# Patient Record
Sex: FEMALE | Race: WHITE | NOT HISPANIC OR LATINO | Employment: OTHER | URBAN - METROPOLITAN AREA
[De-identification: names, ages, dates, MRNs, and addresses within clinical notes are randomized per-mention and may not be internally consistent; named-entity substitution may affect disease eponyms.]

---

## 2024-01-01 ENCOUNTER — HOSPITAL ENCOUNTER (INPATIENT)
Facility: HOSPITAL | Age: 69
LOS: 1 days | DRG: 250 | End: 2024-08-10
Attending: EMERGENCY MEDICINE | Admitting: HOSPITALIST
Payer: MEDICARE

## 2024-01-01 VITALS
RESPIRATION RATE: 65 BRPM | DIASTOLIC BLOOD PRESSURE: 74 MMHG | SYSTOLIC BLOOD PRESSURE: 110 MMHG | WEIGHT: 140 LBS | OXYGEN SATURATION: 95 %

## 2024-01-01 DIAGNOSIS — I46.9 CARDIOPULMONARY ARREST: ICD-10-CM

## 2024-01-01 DIAGNOSIS — I21.3 ST ELEVATION MYOCARDIAL INFARCTION (STEMI), UNSPECIFIED ARTERY: ICD-10-CM

## 2024-01-01 DIAGNOSIS — R07.9 CHEST PAIN: ICD-10-CM

## 2024-01-01 DIAGNOSIS — R57.0: ICD-10-CM

## 2024-01-01 DIAGNOSIS — I46.9 CARDIAC ARREST: Primary | ICD-10-CM

## 2024-01-01 DIAGNOSIS — R00.1 BRADYCARDIA: ICD-10-CM

## 2024-01-01 LAB
ALBUMIN SERPL BCP-MCNC: 4 G/DL (ref 3.5–5.2)
ALP SERPL-CCNC: 67 U/L (ref 55–135)
ALT SERPL W/O P-5'-P-CCNC: 46 U/L (ref 10–44)
ANION GAP SERPL CALC-SCNC: 20 MMOL/L (ref 8–16)
AST SERPL-CCNC: 46 U/L (ref 10–40)
BASOPHILS # BLD AUTO: 0.07 K/UL (ref 0–0.2)
BASOPHILS NFR BLD: 0.6 % (ref 0–1.9)
BILIRUB SERPL-MCNC: 0.4 MG/DL (ref 0.1–1)
BNP SERPL-MCNC: 197 PG/ML (ref 0–99)
BUN SERPL-MCNC: 17 MG/DL (ref 8–23)
CALCIUM SERPL-MCNC: 8.6 MG/DL (ref 8.7–10.5)
CHLORIDE SERPL-SCNC: 101 MMOL/L (ref 95–110)
CO2 SERPL-SCNC: 17 MMOL/L (ref 23–29)
CREAT SERPL-MCNC: 1.2 MG/DL (ref 0.5–1.4)
CREAT SERPL-MCNC: 1.2 MG/DL (ref 0.5–1.4)
DIFFERENTIAL METHOD BLD: ABNORMAL
EOSINOPHIL # BLD AUTO: 0.1 K/UL (ref 0–0.5)
EOSINOPHIL NFR BLD: 0.5 % (ref 0–8)
ERYTHROCYTE [DISTWIDTH] IN BLOOD BY AUTOMATED COUNT: 12.9 % (ref 11.5–14.5)
EST. GFR  (NO RACE VARIABLE): 48.4 ML/MIN/1.73 M^2
GLUCOSE SERPL-MCNC: 311 MG/DL (ref 70–110)
HCT VFR BLD AUTO: 38.2 % (ref 37–48.5)
HGB BLD-MCNC: 12.1 G/DL (ref 12–16)
IMM GRANULOCYTES # BLD AUTO: 0.33 K/UL (ref 0–0.04)
IMM GRANULOCYTES NFR BLD AUTO: 2.7 % (ref 0–0.5)
LYMPHOCYTES # BLD AUTO: 4.3 K/UL (ref 1–4.8)
LYMPHOCYTES NFR BLD: 34.9 % (ref 18–48)
MAGNESIUM SERPL-MCNC: 2.3 MG/DL (ref 1.6–2.6)
MCH RBC QN AUTO: 30.6 PG (ref 27–31)
MCHC RBC AUTO-ENTMCNC: 31.7 G/DL (ref 32–36)
MCV RBC AUTO: 97 FL (ref 82–98)
MONOCYTES # BLD AUTO: 0.4 K/UL (ref 0.3–1)
MONOCYTES NFR BLD: 3 % (ref 4–15)
NEUTROPHILS # BLD AUTO: 7.3 K/UL (ref 1.8–7.7)
NEUTROPHILS NFR BLD: 58.3 % (ref 38–73)
NRBC BLD-RTO: 0 /100 WBC
PLATELET # BLD AUTO: 67 K/UL (ref 150–450)
PMV BLD AUTO: 10.2 FL (ref 9.2–12.9)
POC ACTIVATED CLOTTING TIME K: 311 SEC (ref 74–137)
POC ACTIVATED CLOTTING TIME K: 544 SEC (ref 74–137)
POTASSIUM SERPL-SCNC: 3.1 MMOL/L (ref 3.5–5.1)
PROT SERPL-MCNC: 6.3 G/DL (ref 6–8.4)
RBC # BLD AUTO: 3.96 M/UL (ref 4–5.4)
SAMPLE: ABNORMAL
SAMPLE: ABNORMAL
SAMPLE: NORMAL
SODIUM SERPL-SCNC: 138 MMOL/L (ref 136–145)
TROPONIN I SERPL HS-MCNC: 462 PG/ML (ref 0–14.9)
WBC # BLD AUTO: 12.42 K/UL (ref 3.9–12.7)

## 2024-01-01 PROCEDURE — 5A2204Z RESTORATION OF CARDIAC RHYTHM, SINGLE: ICD-10-PCS | Performed by: STUDENT IN AN ORGANIZED HEALTH CARE EDUCATION/TRAINING PROGRAM

## 2024-01-01 PROCEDURE — 36620 INSERTION CATHETER ARTERY: CPT

## 2024-01-01 PROCEDURE — C1894 INTRO/SHEATH, NON-LASER: HCPCS | Performed by: STUDENT IN AN ORGANIZED HEALTH CARE EDUCATION/TRAINING PROGRAM

## 2024-01-01 PROCEDURE — C1887 CATHETER, GUIDING: HCPCS | Performed by: STUDENT IN AN ORGANIZED HEALTH CARE EDUCATION/TRAINING PROGRAM

## 2024-01-01 PROCEDURE — 94002 VENT MGMT INPAT INIT DAY: CPT

## 2024-01-01 PROCEDURE — 5A1935Z RESPIRATORY VENTILATION, LESS THAN 24 CONSECUTIVE HOURS: ICD-10-PCS | Performed by: EMERGENCY MEDICINE

## 2024-01-01 PROCEDURE — B548ZZA ULTRASONOGRAPHY OF SUPERIOR VENA CAVA, GUIDANCE: ICD-10-PCS | Performed by: EMERGENCY MEDICINE

## 2024-01-01 PROCEDURE — 12000002 HC ACUTE/MED SURGE SEMI-PRIVATE ROOM

## 2024-01-01 PROCEDURE — 27201423 OPTIME MED/SURG SUP & DEVICES STERILE SUPPLY: Performed by: STUDENT IN AN ORGANIZED HEALTH CARE EDUCATION/TRAINING PROGRAM

## 2024-01-01 PROCEDURE — 93010 ELECTROCARDIOGRAM REPORT: CPT | Mod: ,,, | Performed by: GENERAL PRACTICE

## 2024-01-01 PROCEDURE — 99900026 HC AIRWAY MAINTENANCE (STAT)

## 2024-01-01 PROCEDURE — 85025 COMPLETE CBC W/AUTO DIFF WBC: CPT | Performed by: EMERGENCY MEDICINE

## 2024-01-01 PROCEDURE — C1751 CATH, INF, PER/CENT/MIDLINE: HCPCS | Performed by: STUDENT IN AN ORGANIZED HEALTH CARE EDUCATION/TRAINING PROGRAM

## 2024-01-01 PROCEDURE — 02HV33Z INSERTION OF INFUSION DEVICE INTO SUPERIOR VENA CAVA, PERCUTANEOUS APPROACH: ICD-10-PCS | Performed by: EMERGENCY MEDICINE

## 2024-01-01 PROCEDURE — 99291 CRITICAL CARE FIRST HOUR: CPT

## 2024-01-01 PROCEDURE — 5A1223Z PERFORMANCE OF CARDIAC PACING, CONTINUOUS: ICD-10-PCS | Performed by: STUDENT IN AN ORGANIZED HEALTH CARE EDUCATION/TRAINING PROGRAM

## 2024-01-01 PROCEDURE — 0BH17EZ INSERTION OF ENDOTRACHEAL AIRWAY INTO TRACHEA, VIA NATURAL OR ARTIFICIAL OPENING: ICD-10-PCS | Performed by: EMERGENCY MEDICINE

## 2024-01-01 PROCEDURE — 84132 ASSAY OF SERUM POTASSIUM: CPT

## 2024-01-01 PROCEDURE — 31500 INSERT EMERGENCY AIRWAY: CPT

## 2024-01-01 PROCEDURE — 99900035 HC TECH TIME PER 15 MIN (STAT)

## 2024-01-01 PROCEDURE — 93005 ELECTROCARDIOGRAM TRACING: CPT | Performed by: GENERAL PRACTICE

## 2024-01-01 PROCEDURE — 37799 UNLISTED PX VASCULAR SURGERY: CPT

## 2024-01-01 PROCEDURE — 80053 COMPREHEN METABOLIC PANEL: CPT | Performed by: EMERGENCY MEDICINE

## 2024-01-01 PROCEDURE — 25000003 PHARM REV CODE 250: Performed by: STUDENT IN AN ORGANIZED HEALTH CARE EDUCATION/TRAINING PROGRAM

## 2024-01-01 PROCEDURE — 84484 ASSAY OF TROPONIN QUANT: CPT | Performed by: EMERGENCY MEDICINE

## 2024-01-01 PROCEDURE — 92941 PRQ TRLML REVSC TOT OCCL AMI: CPT | Mod: RC | Performed by: STUDENT IN AN ORGANIZED HEALTH CARE EDUCATION/TRAINING PROGRAM

## 2024-01-01 PROCEDURE — 99233 SBSQ HOSP IP/OBS HIGH 50: CPT | Mod: 25,,, | Performed by: STUDENT IN AN ORGANIZED HEALTH CARE EDUCATION/TRAINING PROGRAM

## 2024-01-01 PROCEDURE — 02C03ZZ EXTIRPATION OF MATTER FROM CORONARY ARTERY, ONE ARTERY, PERCUTANEOUS APPROACH: ICD-10-PCS | Performed by: STUDENT IN AN ORGANIZED HEALTH CARE EDUCATION/TRAINING PROGRAM

## 2024-01-01 PROCEDURE — 5A12012 PERFORMANCE OF CARDIAC OUTPUT, SINGLE, MANUAL: ICD-10-PCS | Performed by: EMERGENCY MEDICINE

## 2024-01-01 PROCEDURE — 25000003 PHARM REV CODE 250: Performed by: EMERGENCY MEDICINE

## 2024-01-01 PROCEDURE — 83880 ASSAY OF NATRIURETIC PEPTIDE: CPT | Performed by: EMERGENCY MEDICINE

## 2024-01-01 PROCEDURE — 93458 L HRT ARTERY/VENTRICLE ANGIO: CPT | Mod: 26,XU,51, | Performed by: STUDENT IN AN ORGANIZED HEALTH CARE EDUCATION/TRAINING PROGRAM

## 2024-01-01 PROCEDURE — 82330 ASSAY OF CALCIUM: CPT

## 2024-01-01 PROCEDURE — 92950 HEART/LUNG RESUSCITATION CPR: CPT

## 2024-01-01 PROCEDURE — 36556 INSERT NON-TUNNEL CV CATH: CPT

## 2024-01-01 PROCEDURE — C1757 CATH, THROMBECTOMY/EMBOLECT: HCPCS | Performed by: STUDENT IN AN ORGANIZED HEALTH CARE EDUCATION/TRAINING PROGRAM

## 2024-01-01 PROCEDURE — 84295 ASSAY OF SERUM SODIUM: CPT

## 2024-01-01 PROCEDURE — 63600175 PHARM REV CODE 636 W HCPCS: Performed by: EMERGENCY MEDICINE

## 2024-01-01 PROCEDURE — 63600175 PHARM REV CODE 636 W HCPCS: Performed by: STUDENT IN AN ORGANIZED HEALTH CARE EDUCATION/TRAINING PROGRAM

## 2024-01-01 PROCEDURE — 93458 L HRT ARTERY/VENTRICLE ANGIO: CPT | Mod: XU | Performed by: STUDENT IN AN ORGANIZED HEALTH CARE EDUCATION/TRAINING PROGRAM

## 2024-01-01 PROCEDURE — 87205 SMEAR GRAM STAIN: CPT | Performed by: EMERGENCY MEDICINE

## 2024-01-01 PROCEDURE — 83735 ASSAY OF MAGNESIUM: CPT | Performed by: EMERGENCY MEDICINE

## 2024-01-01 PROCEDURE — 63600175 PHARM REV CODE 636 W HCPCS

## 2024-01-01 PROCEDURE — 27100171 HC OXYGEN HIGH FLOW UP TO 24 HOURS

## 2024-01-01 PROCEDURE — 92941 PRQ TRLML REVSC TOT OCCL AMI: CPT | Mod: RC,,, | Performed by: STUDENT IN AN ORGANIZED HEALTH CARE EDUCATION/TRAINING PROGRAM

## 2024-01-01 PROCEDURE — 4A023N7 MEASUREMENT OF CARDIAC SAMPLING AND PRESSURE, LEFT HEART, PERCUTANEOUS APPROACH: ICD-10-PCS | Performed by: STUDENT IN AN ORGANIZED HEALTH CARE EDUCATION/TRAINING PROGRAM

## 2024-01-01 PROCEDURE — 85014 HEMATOCRIT: CPT

## 2024-01-01 PROCEDURE — 87070 CULTURE OTHR SPECIMN AEROBIC: CPT | Performed by: EMERGENCY MEDICINE

## 2024-01-01 PROCEDURE — B2111ZZ FLUOROSCOPY OF MULTIPLE CORONARY ARTERIES USING LOW OSMOLAR CONTRAST: ICD-10-PCS | Performed by: STUDENT IN AN ORGANIZED HEALTH CARE EDUCATION/TRAINING PROGRAM

## 2024-01-01 PROCEDURE — C1769 GUIDE WIRE: HCPCS | Performed by: STUDENT IN AN ORGANIZED HEALTH CARE EDUCATION/TRAINING PROGRAM

## 2024-01-01 PROCEDURE — C1725 CATH, TRANSLUMIN NON-LASER: HCPCS | Performed by: STUDENT IN AN ORGANIZED HEALTH CARE EDUCATION/TRAINING PROGRAM

## 2024-01-01 PROCEDURE — 82565 ASSAY OF CREATININE: CPT

## 2024-01-01 RX ORDER — AMIODARONE HYDROCHLORIDE 150 MG/3ML
INJECTION, SOLUTION INTRAVENOUS
Status: COMPLETED | OUTPATIENT
Start: 2024-01-01 | End: 2024-01-01

## 2024-01-01 RX ORDER — HEPARIN SODIUM 10000 [USP'U]/ML
INJECTION, SOLUTION INTRAVENOUS; SUBCUTANEOUS
Status: DISCONTINUED | OUTPATIENT
Start: 2024-01-01 | End: 2024-08-16 | Stop reason: HOSPADM

## 2024-01-01 RX ORDER — MAGNESIUM SULFATE HEPTAHYDRATE 500 MG/ML
INJECTION, SOLUTION INTRAMUSCULAR; INTRAVENOUS CODE/TRAUMA/SEDATION MEDICATION
Status: COMPLETED | OUTPATIENT
Start: 2024-01-01 | End: 2024-01-01

## 2024-01-01 RX ORDER — ATROPINE SULFATE 0.1 MG/ML
INJECTION INTRAVENOUS CODE/TRAUMA/SEDATION MEDICATION
Status: COMPLETED | OUTPATIENT
Start: 2024-01-01 | End: 2024-01-01

## 2024-01-01 RX ORDER — NOREPINEPHRINE BITARTRATE/D5W 4MG/250ML
PLASTIC BAG, INJECTION (ML) INTRAVENOUS
Status: DISCONTINUED | OUTPATIENT
Start: 2024-01-01 | End: 2024-08-16 | Stop reason: HOSPADM

## 2024-01-01 RX ORDER — EPINEPHRINE 0.1 MG/ML
INJECTION INTRAVENOUS CODE/TRAUMA/SEDATION MEDICATION
Status: COMPLETED | OUTPATIENT
Start: 2024-01-01 | End: 2024-01-01

## 2024-01-01 RX ORDER — EPINEPHRINE 0.1 MG/ML
INJECTION INTRAVENOUS
Status: COMPLETED | OUTPATIENT
Start: 2024-01-01 | End: 2024-01-01

## 2024-01-01 RX ORDER — SODIUM CHLORIDE, SODIUM LACTATE, POTASSIUM CHLORIDE, CALCIUM CHLORIDE 600; 310; 30; 20 MG/100ML; MG/100ML; MG/100ML; MG/100ML
INJECTION, SOLUTION INTRAVENOUS CONTINUOUS
Status: DISCONTINUED | OUTPATIENT
Start: 2024-01-01 | End: 2024-08-16 | Stop reason: HOSPADM

## 2024-01-01 RX ORDER — LIDOCAINE HYDROCHLORIDE 20 MG/ML
INJECTION INTRAVENOUS
Status: DISCONTINUED | OUTPATIENT
Start: 2024-01-01 | End: 2024-08-16 | Stop reason: HOSPADM

## 2024-01-01 RX ORDER — CALCIUM CHLORIDE INJECTION 100 MG/ML
INJECTION, SOLUTION INTRAVENOUS CODE/TRAUMA/SEDATION MEDICATION
Status: COMPLETED | OUTPATIENT
Start: 2024-01-01 | End: 2024-01-01

## 2024-01-01 RX ORDER — LIDOCAINE HYDROCHLORIDE ANHYDROUS AND DEXTROSE MONOHYDRATE .8; 5 G/100ML; G/100ML
INJECTION, SOLUTION INTRAVENOUS
Status: COMPLETED | OUTPATIENT
Start: 2024-01-01 | End: 2024-01-01

## 2024-01-01 RX ORDER — ROCURONIUM BROMIDE 10 MG/ML
INJECTION, SOLUTION INTRAVENOUS CODE/TRAUMA/SEDATION MEDICATION
Status: COMPLETED | OUTPATIENT
Start: 2024-01-01 | End: 2024-01-01

## 2024-01-01 RX ORDER — EPINEPHRINE 0.1 MG/ML
INJECTION INTRAVENOUS
Status: DISPENSED
Start: 2024-01-01 | End: 2024-08-11

## 2024-01-01 RX ORDER — LIDOCAINE HYDROCHLORIDE 20 MG/ML
INJECTION INTRAVENOUS CODE/TRAUMA/SEDATION MEDICATION
Status: COMPLETED | OUTPATIENT
Start: 2024-01-01 | End: 2024-01-01

## 2024-01-01 RX ORDER — SODIUM BICARBONATE 1 MEQ/ML
SYRINGE (ML) INTRAVENOUS CODE/TRAUMA/SEDATION MEDICATION
Status: COMPLETED | OUTPATIENT
Start: 2024-01-01 | End: 2024-01-01

## 2024-01-01 RX ORDER — AMIODARONE HYDROCHLORIDE 150 MG/3ML
INJECTION, SOLUTION INTRAVENOUS CODE/TRAUMA/SEDATION MEDICATION
Status: COMPLETED | OUTPATIENT
Start: 2024-01-01 | End: 2024-01-01

## 2024-01-01 RX ORDER — NOREPINEPHRINE BITARTRATE/D5W 4MG/250ML
PLASTIC BAG, INJECTION (ML) INTRAVENOUS
Status: DISPENSED
Start: 2024-01-01 | End: 2024-08-11

## 2024-01-01 RX ORDER — EPINEPHRINE 0.1 MG/ML
INJECTION INTRAVENOUS
Status: DISCONTINUED | OUTPATIENT
Start: 2024-01-01 | End: 2024-08-16 | Stop reason: HOSPADM

## 2024-01-01 RX ORDER — POTASSIUM CHLORIDE 14.9 MG/ML
INJECTION INTRAVENOUS
Status: DISCONTINUED | OUTPATIENT
Start: 2024-01-01 | End: 2024-08-16 | Stop reason: HOSPADM

## 2024-01-01 RX ORDER — ETOMIDATE 2 MG/ML
INJECTION INTRAVENOUS CODE/TRAUMA/SEDATION MEDICATION
Status: COMPLETED | OUTPATIENT
Start: 2024-01-01 | End: 2024-01-01

## 2024-01-01 RX ORDER — SODIUM BICARBONATE 1 MEQ/ML
SYRINGE (ML) INTRAVENOUS
Status: COMPLETED | OUTPATIENT
Start: 2024-01-01 | End: 2024-01-01

## 2024-01-01 RX ADMIN — LIDOCAINE HYDROCHLORIDE 100 MG: 20 INJECTION, SOLUTION INTRAVENOUS at 01:08

## 2024-01-01 RX ADMIN — EPINEPHRINE 1 MG: 0.1 INJECTION, SOLUTION ENDOTRACHEAL; INTRACARDIAC; INTRAVENOUS at 01:08

## 2024-01-01 RX ADMIN — SODIUM CHLORIDE, SODIUM LACTATE, POTASSIUM CHLORIDE, AND CALCIUM CHLORIDE 1000 ML: .6; .31; .03; .02 INJECTION, SOLUTION INTRAVENOUS at 01:08

## 2024-01-01 RX ADMIN — EPINEPHRINE 1 MG: 0.1 INJECTION, SOLUTION ENDOTRACHEAL; INTRACARDIAC; INTRAVENOUS at 03:08

## 2024-01-01 RX ADMIN — SODIUM BICARBONATE 50 MEQ: 84 INJECTION, SOLUTION INTRAVENOUS at 01:08

## 2024-01-01 RX ADMIN — ETOMIDATE 20 MG: 2 INJECTION, SOLUTION INTRAVENOUS at 01:08

## 2024-01-01 RX ADMIN — LIDOCAINE HYDROCHLORIDE ANHYDROUS AND DEXTROSE MONOHYDRATE 1 MG/MIN: .8; 5 INJECTION, SOLUTION INTRAVENOUS at 01:08

## 2024-01-01 RX ADMIN — EPINEPHRINE 1 MG: 0.1 INJECTION, SOLUTION ENDOTRACHEAL; INTRACARDIAC; INTRAVENOUS at 02:08

## 2024-01-01 RX ADMIN — AMIODARONE HYDROCHLORIDE 150 MG: 50 INJECTION, SOLUTION INTRAVENOUS at 03:08

## 2024-01-01 RX ADMIN — AMIODARONE HYDROCHLORIDE 300 MG: 50 INJECTION, SOLUTION INTRAVENOUS at 01:08

## 2024-01-01 RX ADMIN — ROCURONIUM BROMIDE 100 MG: 10 INJECTION, SOLUTION INTRAVENOUS at 01:08

## 2024-01-01 RX ADMIN — EPINEPHRINE 0.01 MCG/KG/MIN: 1 INJECTION INTRAMUSCULAR; INTRAVENOUS; SUBCUTANEOUS at 01:08

## 2024-01-01 RX ADMIN — ATROPINE SULFATE 1 MG: 0.1 INJECTION, SOLUTION INTRAVENOUS at 01:08

## 2024-01-01 RX ADMIN — AMIODARONE HYDROCHLORIDE 150 MG: 50 INJECTION, SOLUTION INTRAVENOUS at 01:08

## 2024-01-01 RX ADMIN — MAGNESIUM SULFATE HEPTAHYDRATE 2 G: 500 INJECTION, SOLUTION INTRAMUSCULAR; INTRAVENOUS at 01:08

## 2024-01-01 RX ADMIN — SODIUM BICARBONATE 25 MEQ: 84 INJECTION, SOLUTION INTRAVENOUS at 03:08

## 2024-01-01 RX ADMIN — CALCIUM CHLORIDE 1 G: 100 INJECTION, SOLUTION INTRAVENOUS at 01:08

## 2024-08-10 NOTE — CARE UPDATE
08/10/24 1320   Patient Assessment/Suction   Level of Consciousness (AVPU) unresponsive   All Lung Fields Breath Sounds clear   Rhythm/Pattern, Respiratory assisted mechanically   Cough Frequency no cough   Suction Method tracheal;sample obtained and sent to lab   $ Suction Charges Inline Suction Procedure Stat Charge   Secretions Amount scant   Secretions Color red-streaked   Secretions Characteristics thick   Sputum Collection sample obtained per suctioning   Sent to the lab? Yes   Skin Integrity   $ Wound Care Tech Time 15 min   Area Observed Cheek;Right;Left   Skin Appearance without discoloration   PRE-TX-O2   Device (Oxygen Therapy) ventilator   $ Is the patient on High Flow Oxygen? Yes   Pulse 93   Resp (!) 28   BP (!) 134/106        Airway - Non-Surgical 08/10/24 1320 Endotracheal Tube   Placement Date/Time: 08/10/24 1320   Present Prior to Hospital Arrival?: No  Method of Intubation: Glidescope  Staff/Resident Name(s): Dr. Uriostegui  Airway Device: Endotracheal Tube  Airway Device Size: 7.5  Depth of Insertion (cm): 20  Secured at: Teeth   Secured at 22 cm   Measured At Lips   Secured Location Center   Secured by Commercial tube murillo   Bite Block secure and patent   Site Condition Cool;Dry   Status Intact;Secured;Patent   Site Assessment Clean;Dry   Cuff Pressure   (mlt)   Airway Safety   Is Ambu Bag and Mask with Patient? Yes, Adult Ambu Bag and Mask   Suction set is at the bedside? Yes   Vent Select   Conventional Vent Y   Intubation? Initial intubation   $ Ventilator Initial 1   Charged w/in last 24h YES   Preset Conventional Ventilator Settings   Vent ID [id number]   Vent Type    Ventilation Type VC   Vent Mode A/C   Humidity HME   Set Rate 18 BPM   Vt Set 320 mL   PEEP/CPAP 5 cmH20   Conventional Ventilator Alarms   Alarms On Y   Ve High Alarm 20 L/min   Ve Low Alarm 3 L/min   Vt High Alarm 1000 mL   Vt Low Alarm 200 mL   Ready to Wean/Extubation Screen   PEEP <=8 (chart #) 5   Code Blue/Rapid  Response   Respiratory Therapist Present sulema mojica crt   Respiratory Therapist Present ajay gastelum rrt   Member Arrival Time 1320   Member Departure Time 1420   Equipment Used Resuscitation Bag with Mask;Other (see comments)  (vent)   $ Chest Compressions Performed? Tech Time 30 min;Tech Time 15 min  (cpr started and stopped multiple times shifting from pulse to pulselessness)   $ Intubation Yes   Intubation Confirmation CO2 Colorimetric Device   $ Pt. Transferred to Cath Lab   Code Blue Comments patient lost and regained pulse multiple times, percutaneous pacemaker placed in trauma room and 1410 transferred to cath lab on portable vent

## 2024-08-10 NOTE — Clinical Note
The catheter was inserted over the wire into the ostium   right coronary artery. An angiography was performed of the right coronary arteries. The angiography was performed via power injection. The injected amount was 6 mL contrast at 3 mL/s. The PSI from the power injection was 300. The result was suboptimal..

## 2024-08-10 NOTE — Clinical Note
The catheter was inserted in the right ventricle. The transvenous pacing lead was inserted into the RV.

## 2024-08-10 NOTE — ED PROVIDER NOTES
Encounter Date: 8/10/2024       History     Chief Complaint   Patient presents with    Cardiac Arrest     EMS arrived on scene at 1250 for burning in chest. Patient seized witnessed by EMS.      69-year-old female with unknown past medical history.  Patient presents emergency department via EMS with complaint of VFib arrest.  According to paramedics patient has been complaining of midsternal chest pain throughout the day.  Upon their arrival patient found to have a Coron[place] sign clutching her chest.  Shortly after patient was being transported to the implants patient had a syncopal episode in which she clenched up her upper extremities and passed out.  Patient found to be in VFib arrest.  At that time patient was shocked once and transported to the emergency department further evaluation.  EMS was unable to establish airway prior to arrival.  Upon arrival to emergency department patient found to be clenched with sinus bradycardia.  Initial monitor did show acute ST segment MI as well.  Patient underwent emergent RS I intubation in emergency department.  Afterwards emergency cath lab activation was requested.  Patient initial arrival to the emergency department found with GCS of E-4 M-2V 1=7.  Patient did have corneal and gag reflex intact.  After RS I completed patient did have a Bradycardiac  PE arrest.  Patient did receive multiple rounds of epinephrine as well as atropine, sodium bicarb and IV fluid resuscitation.  Patient initial totaled cardiac arrest was proximally 6 minutes.  In emergency department patient had a recurrence of arrest for proximally 2 minutes.  Did have return of spontaneous circulation.  Initial cardiac echo showed hypertrophic heart with pericardial effusion which was noted to be small to moderate.  Patient did have hypodynamic heart as well.  Patient instilled amiodarone 300 mg and began on amiodarone infusion as well as given lidocaine and magnesium along with recurrent sodium bicarb.   Patient underwent transcutaneous and transvenous cardiac pacing in emergency department.  Patient did receive capture with transvenous pacer.  Patient subsequently emergently transferred to cath lab for PTCI intervention.      Review of patient's allergies indicates:  No Known Allergies  No past medical history on file.  No past surgical history on file.  No family history on file.     Review of Systems   Unable to perform ROS: Acuity of condition       Physical Exam     Initial Vitals   BP Pulse Resp Temp SpO2   08/10/24 1318 08/10/24 1318 08/10/24 1318 -- 08/10/24 1358   (!) 134/16 79 20  95 %      MAP       --                Physical Exam    Constitutional: She appears distressed.   Thin appearing female in extremis   Eyes: Conjunctivae are normal. Pupils are equal, round, and reactive to light. Right eye exhibits no discharge. Left eye exhibits no discharge. No scleral icterus.   Positive corneal reflex bilaterally   Neck: No thyromegaly present. No tracheal deviation present. No JVD present.   Cardiovascular:  Regular rhythm and normal heart sounds.     Exam reveals no gallop and no friction rub.       No murmur heard.  Bradycardia   Pulmonary/Chest: No stridor. No respiratory distress. She has no rhonchi. She exhibits no tenderness.   Course breath sounds with bag-valve mask ventilation   Abdominal: Abdomen is soft. Bowel sounds are normal. She exhibits no distension and no mass. There is no abdominal tenderness. There is no rebound and no guarding.   Musculoskeletal:         General: No tenderness or edema.     Lymphadenopathy:     She has no cervical adenopathy.   Neurological:   GCS 7, patient status post cardiopulmonary arrest.  Underwent emergent intubation.  No withdrawal to pain.  Positive corneal and gag reflex noted.   Skin:   Cold, cap refill proximally 3 seconds         ED Course   Arterial Line    Date/Time: 8/10/2024 6:41 PM  Location procedure was performed: [facility] EMERGENCY DEPARTMENT    Performed  by: Fermin Uriostegui MD  Authorized by: Jackie Cha MD  Consent Done: Emergent Situation  Preparation: Patient was prepped and draped in the usual sterile fashion.  Indications: multiple ABGs, respiratory failure and hemodynamic monitoring  Location: right radial    Patient sedated: no  Gregor's test normal: yes  Needle gauge: 20  Number of attempts: 1  Complications: No  Specimens: No  Implants: No  Post-procedure: line sutured and dressing applied  Post-procedure CMS: normal  Patient tolerance: Patient tolerated the procedure well with no immediate complications      Central Line    Date/Time: 8/10/2024 6:41 PM    Performed by: Fermin Uriostegui MD  Authorized by: Jackie Cha MD    Location procedure was performed:  [facility] EMERGENCY DEPARTMENT  Consent Done ?:  Emergent Situation  Time out complete?: Verified correct patient, procedure, equipment, staff, and site/side    Indications:  Med administration, hemodialysis, vascular access and hemodynamic monitoring  Anesthesia:  Local infiltration  Local anesthetic:  Lidocaine 1% without epinephrine  Anesthetic total (ml):  4  Preparation:  Skin prepped with ChloraPrep  Skin prep agent dried: Skin prep agent completely dried prior to procedure    Sterile barriers: All five maximal sterile barriers used - gloves, gown, cap, mask and large sterile sheet    Hand hygiene: Hand hygiene performed immediately prior to central venous catheter insertion    Location:  Right internal jugular  Catheter type:  Triple lumen  Catheter size:  8 Fr  Inserted Catheter Length (cm):  14  Ultrasound guidance: Yes    Vessel Caliber:  Large   patent  Comprressibility:  Normal  Needle advanced into vessel with real time ultrasound guidance.    Guidewire confirmed in vessel.    Steril sheath on probe.    Sterile gel used.  Manometry: No    Number of attempts:  1  Securement:  Line sutured, chlorhexidine patch and blood return through all ports  Complications: No    Specimens: No     Implants: No    XRay:  Placement verified by x-ray and successful placement  Adverse Events:  NoneTermination Site: subclavian  Intubation    Date/Time: 8/10/2024 6:43 PM  Location procedure was performed: [facility] EMERGENCY DEPARTMENT    Performed by: Fermin Uriostegui MD  Authorized by: Jackie Cha MD  Consent Done: Emergent Situation  Indications: respiratory distress, respiratory failure and airway protection  Intubation method: video-assisted  Patient status: paralyzed (RSI)  Preoxygenation: mask and bag valve mask  Sedatives: etomidate  Paralytic: rocuronium  Laryngoscope size: Glide 4  Tube size: 7.5 mm  Tube type: cuffed  Number of attempts: 1  Cricoid pressure: no  Cords visualized: yes  Post-procedure assessment: chest rise, ETCO2 monitor and CO2 detector  Breath sounds: clear  Cuff inflated: yes  ETT to lip: 22 cm  Tube secured with: ETT murillo  Chest x-ray interpreted by me.  Chest x-ray findings: endotracheal tube in appropriate position  Patient tolerance: Patient tolerated the procedure well with no immediate complications  Complications: No  Specimens: No  Implants: No        Labs Reviewed   CBC W/ AUTO DIFFERENTIAL - Abnormal       Result Value    WBC 12.42      RBC 3.96 (*)     Hemoglobin 12.1      Hematocrit 38.2      MCV 97      MCH 30.6      MCHC 31.7 (*)     RDW 12.9      Platelets 67 (*)     MPV 10.2      Immature Granulocytes 2.7 (*)     Gran # (ANC) 7.3      Immature Grans (Abs) 0.33 (*)     Lymph # 4.3      Mono # 0.4      Eos # 0.1      Baso # 0.07      nRBC 0      Gran % 58.3      Lymph % 34.9      Mono % 3.0 (*)     Eosinophil % 0.5      Basophil % 0.6      Differential Method Automated     COMPREHENSIVE METABOLIC PANEL - Abnormal    Sodium 138      Potassium 3.1 (*)     Chloride 101      CO2 17 (*)     Glucose 311 (*)     BUN 17      Creatinine 1.2      Calcium 8.6 (*)     Total Protein 6.3      Albumin 4.0      Total Bilirubin 0.4      Alkaline Phosphatase 67      AST 46 (*)      ALT 46 (*)     eGFR 48.4 (*)     Anion Gap 20 (*)    MAGNESIUM    Magnesium 2.3     TROPONIN I HIGH SENSITIVITY   ISTAT CREATININE    POC Creatinine 1.2      Sample VENOUS     POCT GLUCOSE MONITORING CONTINUOUS   POCT CREATININE        ECG Results              EKG 12-lead (In process)        Collection Time Result Time QRS Duration OHS QTC Calculation    08/10/24 13:19:25 08/10/24 13:58:56 130 352                     In process by Interface, Lab In [facility] (08/10/24 13:59:00)                   Narrative:    Test Reason : R07.9,    Vent. Rate : 045 BPM     Atrial Rate : 267 BPM     P-R Int : 000 ms          QRS Dur : 130 ms      QT Int : 408 ms       P-R-T Axes : 000 090 -75 degrees     QTc Int : 352 ms    Undetermined rhythm  Right bundle branch block  Inferior infarct , possibly acute  Anterior injury pattern    ACUTE MI / STEMI    Consider right ventricular involvement in acute inferior infarct  Abnormal ECG  No previous ECGs available    Referred By: [email or web address]   SELF           Confirmed By:                                   Imaging Results              X-Ray Chest AP Portable (Final result)  Result time 08/10/24 14:14:49      Final result by Shay Merino MD (08/10/24 14:14:49)                   Impression:      1. Support tubes and lines as above.  2. Interstitial and superior lung zone ground-glass alveolar opacities could represent atypical infection, pulmonary edema, or alveolar hemorrhage.      Electronically signed by: Shay Merino  Date:    08/10/2024  Time:    14:14               Narrative:    EXAMINATION:  XR CHEST AP PORTABLE    CLINICAL HISTORY:  Chest Pain;    FINDINGS:  Portable chest at 1346 without comparisons shows endotracheal tube in place with tip 4 cm proximal to carol.  Right IJ central venous catheter tip lies in SVC.  Cardiomediastinal silhouette is otherwise normal.    Slightly prominent interstitial and ground-glass alveolar opacities occurrence superior lung zones bilaterally with  slightly prominent interstitial opacities also noted in lower lung zones.  No pleural effusion or pneumothorax.  Central pulmonary vasculature is normal.  No acute osseous abnormality.                                       Medications   lactated ringers infusion (has no administration in time range)   lactated ringers infusion (has no administration in time range)   amiodarone in dextrose 150 mg/100 mL (1.5 mg/mL) loading dose (has no administration in time range)   amiodarone (CORDARONE) 360 mg/200 mL (1.8 mg/mL) infusion (has no administration in time range)   NORepinephrine bitartrate-D5W 4 mg/250 mL (16 mcg/mL) infusion Soln (has no administration in time range)   EPINEPHrine (ADRENALIN) 0.1 mg/mL injection (has no administration in time range)   EPINEPHrine 0.1 mg/mL injection (1 mg Intravenous Canceled Entry 8/10/24 1426)   EPINEPHrine (ADRENALIN) 5 mg in D5W 250 mL infusion (0.2 mcg/kg/min × 63.5 kg Intravenous Rate/Dose Change 8/10/24 1457)   NORepinephrine 4 mg in dextrose 5% 250 mL infusion (premix) (0.3 mcg/kg/min × 63.5 kg Intravenous Rate/Dose Change 8/10/24 1531)   potassium chloride 20 mEq in 100 mL IVPB (FOR CENTRAL LINE ADMINISTRATION ONLY) (40 mEq Intravenous New Bag 8/10/24 1420)   heparin (porcine) injection (6,000 Units Intravenous Given 8/10/24 1451)   LIDOcaine (cardiac) injection (100 mg Intravenous Given 8/10/24 1516)   amiodarone injection (150 mg Intravenous Given 8/10/24 1332)   atropine injection (1 mg Intravenous Given 8/10/24 1324)   EPINEPHrine 0.1 mg/mL injection (1 mg Intravenous Given 8/10/24 1400)   sodium bicarbonate 8.4 % (1 mEq/mL) injection (50 mEq Intravenous Given 8/10/24 1326)   calcium chloride 100 mg/mL (10 %) injection (1 g Intravenous Given 8/10/24 1327)   magnesium sulfate 500 mg/mL (50 %) injection (2 g Intravenous Given 8/10/24 1332)   LIDOcaine (cardiac) injection (100 mg Intravenous Given 8/10/24 1332)   LIDOcaine 2000 mg in D5W 250 mL infusion (1 mg/min  Intravenous New Bag 8/10/24 1337)   lactated ringers bolus (1,000 mLs Intravenous New Bag 8/10/24 1337)   rocuronium injection (100 mg Intravenous Given 8/10/24 1315)   etomidate injection (20 mg Intravenous Given 8/10/24 1315)   EPINEPHrine (ADRENALIN) 5 mg infusion (0.01 mcg/kg/min × 63.5 kg Intravenous Given 8/10/24 1346)   EPINEPHrine 0.1 mg/mL injection (1 mg Intravenous Given 8/10/24 1511)   sodium bicarbonate 8.4 % (1 mEq/mL) injection (25 mEq Intravenous Given 8/10/24 1508)   EPINEPHrine 0.1 mg/mL injection (1 mg Intravenous Given 8/10/24 1536)   amiodarone injection (150 mg Intravenous Given 8/10/24 1546)     Medical Decision Making  Amount and/or Complexity of Data Reviewed  Labs: ordered.  Radiology: ordered.    Risk  Prescription drug management.              Attending Attestation:         Attending Critical Care:   Critical Care Times:   Direct Patient Care (initial evaluation, reassessments, and time considering the case)................................................................45 minutes.   Additional History from reviewing old medical records or taking additional history from the family, EMS, PCP, etc.......................5 minutes.   Ordering, Reviewing, and Interpreting Diagnostic Studies...............................................................................................................5 minutes.   Documentation..................................................................................................................................................................................5 minutes.   Consultation with other Physicians. .................................................................................................................................................5 minutes.   Consultation with the patient's family directly relating to the patient's condition, care, and DNR status (when patient unable)......5 minutes.    Other..................................................................................................................................................................................................5 minutes.   ==============================================================  Total Critical Care Time - exclusive of procedural time: 75 minutes.  ==============================================================  Critical care was necessary to treat or prevent imminent or life-threatening deterioration of the following conditions: cardiac arrhythmia, cardiac arrest, airway compromise, hypotension, myocardial infarction and nontraumatic shock.   The following critical care procedures were done by me (see procedure notes): airway management, arterial line placement, central line placement *, CPR *, endotracheal tube placement *, pulse oximetry, transcutaneous pacing and transvenous pacing *.   Critical care was time spent personally by me on the following activities: obtaining history from patient or relative, examination of patient, review of old charts, review of x-rays / CT sent with the patient, ordering lab, x-rays, and/or EKG, development of treatment plan with patient or relative, ordering and performing treatments and interventions, evaluation of patient's response to treatment, discussion with consultants, re-evaluation of patient's conition, ventilator management, end of life discussions and interpretation of cardiac measurements.   Critical Care Condition: critical                      Medical Decision Making:   Initial Assessment:   69-year-old female with unknown past medical history.  Patient presents emergency department via EMS with complaint of VFib arrest.  According to paramedics patient has been complaining of midsternal chest pain throughout the day.  Upon their arrival patient found to have a Coronado sign clutching her chest.  Shortly after patient was being transported to the [facility] patient had a  syncopal episode in which she clenched up her upper extremities and passed out.  Patient found to be in VFib arrest.  At that time patient was shocked once and transported to the emergency department further evaluation.  EMS was unable to establish airway prior to arrival.  Upon arrival to emergency department patient found to be clenched with sinus bradycardia.  Initial monitor did show acute ST segment MI as well.  Patient underwent emergent RS I intubation in emergency department.  Afterwards emergency cath lab activation was requested.  Patient initial arrival to the emergency department found with GCS of E-4 M-2V 1=7.  Patient did have corneal and gag reflex intact.  After RS I completed patient did have a Bradycardiac  PE arrest.  Patient did receive multiple rounds of epinephrine as well as atropine, sodium bicarb and IV fluid resuscitation.  Patient initial totaled cardiac arrest was proximally 6 minutes.  In emergency department patient had a recurrence of arrest for proximally 2 minutes.  Did have return of spontaneous circulation.  Initial cardiac echo showed hypertrophic heart with pericardial effusion which was noted to be small to moderate.  Patient did have hypodynamic heart as well.  Patient instilled amiodarone 300 mg and began on amiodarone infusion as well as given lidocaine and magnesium along with recurrent sodium bicarb.  Patient underwent transcutaneous and transvenous cardiac pacing in emergency department.  Patient did receive capture with transvenous pacer.  Patient subsequently emergently transferred to cath lab for PTCI intervention.    Differential Diagnosis:   Cardiopulmonary arrest, STEMI, inferior lateral STEMI.  Clinical Tests:   Lab Tests: Ordered and Reviewed  Radiological Study: Ordered and Reviewed  Medical Tests: Ordered and Reviewed  Patient emergently intubated in underwent recurrent CPR due to acute inferior lateral STEMI.  Resuscitation team consisted of interventional  cardiology as well as emergency medicine personnel.  Patient did have return of spontaneous circulation and after internal cardiac pacer established had heart rate of 70 with blood pressure of 100/60.  Patient during resuscitation time in emergency department remained hemodynamically unstable.  Had recurrent episodes of dysrhythmias including ventricular tachycardia patient did receive repeat cardioversion while in emergency department.  Was placed on ionotropic pressure support.  Epinephrine drip established as well as a no epinephrine drip.  Patient's family was notified that chances for survival was poor.  Patient was emergently transported to the cath lab for cardiac intervention.             Clinical Impression:  Final diagnoses:  [R07.9] Chest pain  [I46.9] Cardiac arrest (Primary)  [I46.9] Cardiopulmonary arrest  [I21.3] ST elevation myocardial infarction (STEMI), unspecified artery  [R00.1] Bradycardia  [R57.0] Cardiac shock syndrome          ED Disposition Condition    Admit Stable                Fermin Uriostegui MD  08/10/24 [id number]

## 2024-08-10 NOTE — CARE UPDATE
Patient pulseless, family requested no more cpr or defibrillation. Dr Hayes pronounced time of death at 1607.

## 2024-08-10 NOTE — Clinical Note
MD spoke with family. Family requesting no further CPR/defibrillation. Continuing pacing and medications at family request at this time. Family brought to cath lab by MD to see patient.

## 2024-08-10 NOTE — Clinical Note
The catheter was inserted over the wire into the ostium   right coronary artery. An angiography was performed of the right coronary arteries. The angiography was performed via power injection. The injected amount was 6 mL contrast at 3 mL/s. The PSI from the power injection was 300.

## 2024-08-10 NOTE — Clinical Note
The transvenous pacing lead was secured in place in the RV and was placed and capture was confirmed. The pacer was paced at 80 BPM 10 mA 10 mV.

## 2024-08-10 NOTE — CONSULTS
[facility]  Department of Cardiology  Consult Note      PATIENT NAME: Vanda Blake    MRN: [id number]  TODAY'S DATE: 08/10/2024  ADMIT DATE: 8/10/2024                          CONSULT REQUESTED BY: Fermin Uriostegui MD    SUBJECTIVE     PRINCIPAL PROBLEM: <principal problem not specified>    HPI:    Patient is a 69-year-old female who presented to the emergency room after having an arrest at home.  Patient was coded for about 6 minutes in the field apparently.  In the emergency room she also had a Timothy arrest and subsequent VFib arrest.  Patient's family is present and states that she had been in her usual state until this morning at which time she felt more tired than usual.  She did have some vomiting about 2 days ago related to what she thought was food poisoning or a stomach virus.  Her daughter was ill at that time as well.  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  She has not been to the doctor in quite some time but has not had any heart history that they are aware of.      REASON FOR CONSULT:    STEMI      Review of patient's allergies indicates:  No Known Allergies    No past medical history on file.  No past surgical history on file.        REVIEW OF SYSTEMS  Per HPI    OBJECTIVE     VITAL SIGNS (Most Recent)  Pulse: 88 (transvenous pacing initiated by Cardiologist) (08/10/24 1406)  Resp: (!) 28 (08/10/24 1320)  BP: 110/74 (08/10/24 1406)    VENTILATION STATUS  Resp: (!) 28 (08/10/24 1320)     Vent Mode: A/C  Vt Set:  [320 mL] 320 mL  PEEP/CPAP:  [5 cmH20] 5 cmH20        I & O (Last 24H):No intake or output data in the 24 hours ending 08/10/24 1417    WEIGHTS  Wt Readings from Last 1 Encounters:   08/10/24 1318 63.5 kg (140 lb)       PHYSICAL EXAM  CONSTITUTIONAL: critically ill elderly female                  LUNGS: intubated      HOME MEDICATIONS:  No current facility-administered medications on file prior  "to encounter.     No current outpatient medications on file prior to encounter.       SCHEDULED MEDS:   EPINEPHrine        amiodarone        amiodarone in dextrose        NORepinephrine bitartrate-D5W           CONTINUOUS INFUSIONS:   lactated ringers   Intravenous Continuous        lactated ringers   Intravenous Continuous           PRN MEDS:  Current Facility-Administered Medications:     EPINEPHrine, , ,     amiodarone, , ,     amiodarone in dextrose, , ,     NORepinephrine bitartrate-D5W, , ,     LABS AND DIAGNOSTICS     CBC LAST 3 DAYS  Recent Labs   Lab 08/10/24  1326   WBC 12.42   RBC 3.96*   HGB 12.1   HCT 38.2   MCV 97   MCH 30.6   MCHC 31.7*   RDW 12.9   PLT 67*   MPV 10.2   GRAN 58.3  7.3   LYMPH 34.9  4.3   MONO 3.0*  0.4   BASO 0.07   NRBC 0       COAGULATION LAST 3 DAYS  No results for input(s): "LABPT", "INR", "APTT" in the last 168 hours.    CHEMISTRY LAST 3 DAYS  Recent Labs   Lab 08/10/24  1326      K 3.1*      CO2 17*   ANIONGAP 20*   BUN 17   CREATININE 1.2   *   CALCIUM 8.6*   MG 2.3   ALBUMIN 4.0   PROT 6.3   ALKPHOS 67   ALT 46*   AST 46*   BILITOT 0.4       CARDIAC PROFILE LAST 3 DAYS  No results for input(s): "BNP", "CPK", "CPKMB", "LDH", "TROPONINI", "TROPONINIHS" in the last 168 hours.    ENDOCRINE LAST 3 DAYS  No results for input(s): "TSH", "PROCAL" in the last 168 hours.    LAST ARTERIAL BLOOD GAS  ABG  No results for input(s): "PH", "PO2", "PCO2", "HCO3", "BE" in the last 168 hours.    LAST 7 DAYS MICROBIOLOGY   Microbiology Results (last 7 days)       Procedure Component Value Units Date/Time    Culture, Respiratory with Gram Stain [[id number]] Collected: 08/10/24 1355    Order Status: Sent Specimen: Respiratory from Endotracheal Aspirate Updated: 08/10/24 1356            MOST RECENT IMAGING  X-Ray Chest AP Portable  Narrative: EXAMINATION:  XR CHEST AP PORTABLE    CLINICAL HISTORY:  Chest Pain;    FINDINGS:  Portable chest at 1346 without comparisons shows " endotracheal tube in place with tip 4 cm proximal to carol.  Right IJ central venous catheter tip lies in SVC.  Cardiomediastinal silhouette is otherwise normal.    Slightly prominent interstitial and ground-glass alveolar opacities occurrence superior lung zones bilaterally with slightly prominent interstitial opacities also noted in lower lung zones.  No pleural effusion or pneumothorax.  Central pulmonary vasculature is normal.  No acute osseous abnormality.  Impression: 1. Support tubes and lines as above.  2. Interstitial and superior lung zone ground-glass alveolar opacities could represent atypical infection, pulmonary edema, or alveolar hemorrhage.    Electronically signed by: Shay Merino  Date:    08/10/2024  Time:    14:14      ECHOCARDIOGRAM RESULTS (last 5)  No results found for this or any previous visit.      CURRENT/PREVIOUS VISIT EKG  Results for orders placed or performed during the hospital encounter of 08/10/24   EKG 12-lead    Collection Time: 08/10/24  1:19 PM   Result Value Ref Range    QRS Duration 130 ms    [facility] QTC Calculation 352 ms    Narrative    Test Reason : R07.9,    Vent. Rate : 045 BPM     Atrial Rate : 267 BPM     P-R Int : 000 ms          QRS Dur : 130 ms      QT Int : 408 ms       P-R-T Axes : 000 090 -75 degrees     QTc Int : 352 ms    Undetermined rhythm  Right bundle branch block  Inferior infarct , possibly acute  Anterior injury pattern    ACUTE MI / STEMI    Consider right ventricular involvement in acute inferior infarct  Abnormal ECG  No previous ECGs available    Referred By: [email or web address]   SELF           Confirmed By:            ASSESSMENT/PLAN:     There are no active hospital problems to display for this patient.      ASSESSMENT & PLAN:     STEMI  Cardiac arrest  Bradyarrest   V fib arrest  Hypokalemia  Acute hypoxic respiratory failure       RECOMMENDATIONS:    After undergoing multiple rounds of ACLS and CPR compressions, the patient obtain Rosc and had a temporary  pacemaker placed transvenously.  Patient taken emergently to the cath lab for left heart catheterization and temporary pacemaker placement in the groin.  Family was made aware of the situation from a cardiac standpoint and the recommendation for her to go for temporary pacemaker and left heart catheterization.        Diane Sweeney NP  Date of Service: 08/10/2024  2:17 PM

## 2024-08-12 LAB
BACTERIA SPEC AEROBE CULT: NORMAL
GRAM STN SPEC: NORMAL
GRAM STN SPEC: NORMAL

## 2024-08-13 LAB
ALLENS TEST: ABNORMAL
DELSYS: ABNORMAL
GLUCOSE SERPL-MCNC: 447 MG/DL (ref 70–110)
HCO3 UR-SCNC: 12.1 MMOL/L (ref 24–28)
HCT VFR BLD CALC: 31 %PCV (ref 36–54)
MODE: ABNORMAL
PCO2 BLDA: 39.5 MMHG (ref 35–45)
PH SMN: 7.09 [PH] (ref 7.35–7.45)
PO2 BLDA: 429 MMHG (ref 80–100)
POC BE: -18 MMOL/L
POC IONIZED CALCIUM: 1.42 MMOL/L (ref 1.06–1.42)
POC SATURATED O2: 100 % (ref 95–100)
POC TCO2: 13 MMOL/L (ref 23–27)
POTASSIUM BLD-SCNC: 2.7 MMOL/L (ref 3.5–5.1)
SAMPLE: ABNORMAL
SITE: ABNORMAL
SODIUM BLD-SCNC: 138 MMOL/L (ref 136–145)

## 2024-08-16 LAB
OHS QRS DURATION: 130 MS
OHS QTC CALCULATION: 352 MS

## (undated) DEVICE — SHEATH INTRODUCER 6FR 11CM

## (undated) DEVICE — CATH DXTERITY JR40 100CM 5FR

## (undated) DEVICE — CATH EMERGE MR 15 X 2.50

## (undated) DEVICE — GUIDEWIRE RUNTHROUGH EF 180CM

## (undated) DEVICE — CATH DXTERITY PG145 110CM 6FR

## (undated) DEVICE — GUIDEWIRE INQWIRE SE 3MM JTIP

## (undated) DEVICE — KIT INDIGO CATH RX ASP 140CM

## (undated) DEVICE — OMNIPAQUE 350MG 150ML VIAL

## (undated) DEVICE — CATH SWAN GANZ PAC BIPOLAR 5FR

## (undated) DEVICE — CATH DXTERITY JL35 100CM 5FR

## (undated) DEVICE — CATH LNCHR JR40 6F 110CM

## (undated) DEVICE — KIT ACCESS NDL ECHOGENIC 6FR

## (undated) DEVICE — KIT MICROINTRODUCE MINI 5X10CM